# Patient Record
Sex: MALE | Race: BLACK OR AFRICAN AMERICAN | Employment: UNEMPLOYED | URBAN - METROPOLITAN AREA
[De-identification: names, ages, dates, MRNs, and addresses within clinical notes are randomized per-mention and may not be internally consistent; named-entity substitution may affect disease eponyms.]

---

## 2017-02-26 ENCOUNTER — APPOINTMENT (OUTPATIENT)
Dept: GENERAL RADIOLOGY | Age: 47
End: 2017-02-26
Attending: EMERGENCY MEDICINE
Payer: OTHER GOVERNMENT

## 2017-02-26 ENCOUNTER — HOSPITAL ENCOUNTER (EMERGENCY)
Age: 47
Discharge: HOME | End: 2017-02-26
Attending: EMERGENCY MEDICINE
Payer: OTHER GOVERNMENT

## 2017-02-26 VITALS
OXYGEN SATURATION: 99 % | DIASTOLIC BLOOD PRESSURE: 72 MMHG | BODY MASS INDEX: 28.17 KG/M2 | RESPIRATION RATE: 16 BRPM | WEIGHT: 165 LBS | TEMPERATURE: 97 F | SYSTOLIC BLOOD PRESSURE: 143 MMHG | HEART RATE: 88 BPM | HEIGHT: 64 IN

## 2017-02-26 DIAGNOSIS — R07.89 CHEST WALL PAIN: Primary | ICD-10-CM

## 2017-02-26 PROCEDURE — 99283 EMERGENCY DEPT VISIT LOW MDM: CPT | Performed by: EMERGENCY MEDICINE

## 2017-02-26 PROCEDURE — 71020 XR CHEST PA LAT: CPT

## 2017-02-26 PROCEDURE — 71100 X-RAY EXAM RIBS UNI 2 VIEWS: CPT

## 2017-02-26 RX ORDER — METHOCARBAMOL 750 MG/1
750 TABLET, FILM COATED ORAL 4 TIMES DAILY
Qty: 30 TAB | Refills: 0 | Status: SHIPPED | OUTPATIENT
Start: 2017-02-26 | End: 2017-03-06

## 2017-02-26 RX ORDER — TRAMADOL HYDROCHLORIDE 50 MG/1
50 TABLET ORAL
Qty: 20 TAB | Refills: 0 | Status: SHIPPED | OUTPATIENT
Start: 2017-02-26

## 2017-02-26 NOTE — ED PROVIDER NOTES
HPI Comments: Patient states that he was at a family member's house 2 days ago when he fell in the street and struck his right chest on the road. Since then he has had achy nonradiating pain worse with certain movements or deep breaths. He has not taken any medicine for his symptoms and he denies similar symptoms in the past.  He denies any shortness of breath or cough. Elements of this note were created with speech recognition software. As such, there may be errors of speech recognition present. Patient is a 42 y.o. male presenting with rib pain. The history is provided by the patient. Rib Pain   Pertinent negatives include no fever and no vomiting. History reviewed. No pertinent past medical history. Past Surgical History:   Procedure Laterality Date    HX OTHER SURGICAL      Hernia         History reviewed. No pertinent family history. Social History     Social History    Marital status: SINGLE     Spouse name: N/A    Number of children: N/A    Years of education: N/A     Occupational History    Not on file. Social History Main Topics    Smoking status: Current Every Day Smoker    Smokeless tobacco: Never Used      Comment: 3-4 black and milds per day    Alcohol use Yes      Comment: Socially    Drug use: No    Sexual activity: Not on file     Other Topics Concern    Not on file     Social History Narrative         ALLERGIES: Review of patient's allergies indicates no known allergies. Review of Systems   Constitutional: Negative for chills and fever. Gastrointestinal: Negative for nausea and vomiting. All other systems reviewed and are negative. Vitals:    02/26/17 0403   BP: 145/78   Resp: 14   Temp: 97 °F (36.1 °C)   SpO2: 98%   Weight: 74.8 kg (165 lb)   Height: 5' 4\" (1.626 m)            Physical Exam   Constitutional: He is oriented to person, place, and time. He appears well-developed and well-nourished. HENT:   Head: Normocephalic and atraumatic.    Eyes: Conjunctivae are normal. Pupils are equal, round, and reactive to light. Neck: Normal range of motion. Neck supple. Cardiovascular: Normal rate and regular rhythm. Pulmonary/Chest: No respiratory distress. He has no wheezes. He has no rales. He exhibits tenderness. Right anterior chest wall tenderness to palpation as indicated. Abdomen is soft, no tenderness to deep palpation of right upper quadrant   Musculoskeletal: He exhibits no edema or tenderness. Neurological: He is alert and oriented to person, place, and time. Skin: Skin is warm and dry. Psychiatric: He has a normal mood and affect. His behavior is normal.   Nursing note and vitals reviewed.        MDM  Number of Diagnoses or Management Options  Chest wall pain: new and does not require workup  Diagnosis management comments: Differential diagnoses: Chest wall contusion, rib fracture, pneumothorax  4:48 AM discussed x-ray findings with patient, no abnormality seen       Amount and/or Complexity of Data Reviewed  Tests in the radiology section of CPT®: ordered and reviewed  Independent visualization of images, tracings, or specimens: yes    Risk of Complications, Morbidity, and/or Mortality  Presenting problems: moderate  Diagnostic procedures: moderate  Management options: moderate    Patient Progress  Patient progress: improved    ED Course       Procedures

## 2017-02-26 NOTE — DISCHARGE INSTRUCTIONS
Musculoskeletal Chest Pain: Care Instructions  Your Care Instructions  Chest pain is not always a sign that something is wrong with your heart or that you have another serious problem. The doctor thinks your chest pain is caused by strained muscles or ligaments, inflamed chest cartilage, or another problem in your chest, rather than by your heart. You may need more tests to find the cause of your chest pain. Follow-up care is a key part of your treatment and safety. Be sure to make and go to all appointments, and call your doctor if you are having problems. Its also a good idea to know your test results and keep a list of the medicines you take. How can you care for yourself at home? · Take pain medicines exactly as directed. ¨ If the doctor gave you a prescription medicine for pain, take it as prescribed. ¨ If you are not taking a prescription pain medicine, ask your doctor if you can take an over-the-counter medicine. · Rest and protect the sore area. · Stop, change, or take a break from any activity that may be causing your pain or soreness. · Put ice or a cold pack on the sore area for 10 to 20 minutes at a time. Try to do this every 1 to 2 hours for the next 3 days (when you are awake) or until the swelling goes down. Put a thin cloth between the ice and your skin. · After 2 or 3 days, apply a heating pad set on low or a warm cloth to the area that hurts. Some doctors suggest that you go back and forth between hot and cold. · Do not wrap or tape your ribs for support. This may cause you to take smaller breaths, which could increase your risk of lung problems. · Mentholated creams such as Bengay or Icy Hot may soothe sore muscles. Follow the instructions on the package. · Follow your doctor's instructions for exercising. · Gentle stretching and massage may help you get better faster. Stretch slowly to the point just before pain begins, and hold the stretch for at least 15 to 30 seconds.  Do this 3 or 4 times a day. Stretch just after you have applied heat. · As your pain gets better, slowly return to your normal activities. Any increased pain may be a sign that you need to rest a while longer. When should you call for help? Call 911 anytime you think you may need emergency care. For example, call if:  · You have chest pain or pressure. This may occur with:  ¨ Sweating. ¨ Shortness of breath. ¨ Nausea or vomiting. ¨ Pain that spreads from the chest to the neck, jaw, or one or both shoulders or arms. ¨ Dizziness or lightheadedness. ¨ A fast or uneven pulse. After calling 911, chew 1 adult-strength aspirin. Wait for an ambulance. Do not try to drive yourself. · You have sudden chest pain and shortness of breath, or you cough up blood. Call your doctor now or seek immediate medical care if:  · You have any trouble breathing. · Your chest pain gets worse. · Your chest pain occurs consistently with exercise and is relieved by rest.  Watch closely for changes in your health, and be sure to contact your doctor if:  · Your chest pain does not get better after 1 week. Where can you learn more? Go to http://jolene-harmony.info/. Enter V293 in the search box to learn more about \"Musculoskeletal Chest Pain: Care Instructions. \"  Current as of: May 27, 2016  Content Version: 11.1  © 0289-4861 [employer]. Care instructions adapted under license by [employer] (which disclaims liability or warranty for this information). If you have questions about a medical condition or this instruction, always ask your healthcare professional. [place] [address] any warranty or liability for your use of this information.

## 2023-05-27 ENCOUNTER — HOSPITAL ENCOUNTER (EMERGENCY)
Age: 53
Discharge: HOME | End: 2023-05-27
Attending: EMERGENCY MEDICINE
Payer: COMMERCIAL

## 2023-05-27 ENCOUNTER — APPOINTMENT (OUTPATIENT)
Dept: GENERAL RADIOLOGY | Age: 53
End: 2023-05-27
Payer: COMMERCIAL

## 2023-05-27 ENCOUNTER — APPOINTMENT (OUTPATIENT)
Dept: CT IMAGING | Age: 53
End: 2023-05-27
Payer: COMMERCIAL

## 2023-05-27 VITALS
OXYGEN SATURATION: 96 % | TEMPERATURE: 98.5 F | DIASTOLIC BLOOD PRESSURE: 97 MMHG | WEIGHT: 155 LBS | BODY MASS INDEX: 24.33 KG/M2 | HEIGHT: 67 IN | SYSTOLIC BLOOD PRESSURE: 127 MMHG | HEART RATE: 60 BPM | RESPIRATION RATE: 19 BRPM

## 2023-05-27 DIAGNOSIS — R07.89 ATYPICAL CHEST PAIN: Primary | ICD-10-CM

## 2023-05-27 LAB
ALBUMIN SERPL-MCNC: 4.1 G/DL (ref 3.5–5)
ALBUMIN/GLOB SERPL: 1.3 (ref 0.4–1.6)
ALP SERPL-CCNC: 97 U/L (ref 45–117)
ALT SERPL-CCNC: 41 U/L (ref 13–61)
ANION GAP SERPL CALC-SCNC: 12 MMOL/L (ref 2–11)
AST SERPL-CCNC: 26 U/L (ref 15–37)
BILIRUB SERPL-MCNC: 0.2 MG/DL (ref 0.2–1.1)
BUN SERPL-MCNC: 15 MG/DL (ref 7–18)
CALCIUM SERPL-MCNC: 9.6 MG/DL (ref 8.3–10.4)
CHLORIDE SERPL-SCNC: 100 MMOL/L (ref 98–107)
CO2 SERPL-SCNC: 27 MMOL/L (ref 21–32)
CREAT SERPL-MCNC: 0.97 MG/DL (ref 0.8–1.5)
EKG ATRIAL RATE: 70 BPM
EKG DIAGNOSIS: NORMAL
EKG P AXIS: 52 DEGREES
EKG P-R INTERVAL: 170 MS
EKG Q-T INTERVAL: 386 MS
EKG QRS DURATION: 108 MS
EKG QTC CALCULATION (BAZETT): 414 MS
EKG R AXIS: 46 DEGREES
EKG T AXIS: 36 DEGREES
EKG VENTRICULAR RATE: 69 BPM
ERYTHROCYTE [DISTWIDTH] IN BLOOD BY AUTOMATED COUNT: 11.7 % (ref 11.9–14.6)
GLOBULIN SER CALC-MCNC: 3.2 G/DL (ref 2.8–4.5)
GLUCOSE SERPL-MCNC: 108 MG/DL (ref 65–100)
HCT VFR BLD AUTO: 40.5 % (ref 41.1–50.3)
HGB BLD-MCNC: 13.9 G/DL (ref 13.6–17.2)
MCH RBC QN AUTO: 32 PG (ref 26.1–32.9)
MCHC RBC AUTO-ENTMCNC: 34.3 G/DL (ref 31.4–35)
MCV RBC AUTO: 93.3 FL (ref 82–102)
NRBC # BLD: 0 K/UL (ref 0–0.2)
PLATELET # BLD AUTO: 409 K/UL (ref 150–450)
PMV BLD AUTO: 9.5 FL (ref 9.4–12.3)
POTASSIUM SERPL-SCNC: 3.5 MMOL/L (ref 3.5–5.1)
PROT SERPL-MCNC: 7.3 G/DL (ref 6.4–8.2)
RBC # BLD AUTO: 4.34 M/UL (ref 4.23–5.6)
SODIUM SERPL-SCNC: 139 MMOL/L (ref 133–143)
TROPONIN T SERPL HS-MCNC: <6 NG/L (ref 0–22)
WBC # BLD AUTO: 9.8 K/UL (ref 4.3–11.1)

## 2023-05-27 PROCEDURE — 2580000003 HC RX 258: Performed by: EMERGENCY MEDICINE

## 2023-05-27 PROCEDURE — 96374 THER/PROPH/DIAG INJ IV PUSH: CPT

## 2023-05-27 PROCEDURE — 6360000002 HC RX W HCPCS: Performed by: EMERGENCY MEDICINE

## 2023-05-27 PROCEDURE — 93005 ELECTROCARDIOGRAM TRACING: CPT | Performed by: EMERGENCY MEDICINE

## 2023-05-27 PROCEDURE — 84484 ASSAY OF TROPONIN QUANT: CPT

## 2023-05-27 PROCEDURE — 96375 TX/PRO/DX INJ NEW DRUG ADDON: CPT

## 2023-05-27 PROCEDURE — 71045 X-RAY EXAM CHEST 1 VIEW: CPT

## 2023-05-27 PROCEDURE — 85027 COMPLETE CBC AUTOMATED: CPT

## 2023-05-27 PROCEDURE — 71260 CT THORAX DX C+: CPT

## 2023-05-27 PROCEDURE — 80053 COMPREHEN METABOLIC PANEL: CPT

## 2023-05-27 PROCEDURE — 99285 EMERGENCY DEPT VISIT HI MDM: CPT

## 2023-05-27 PROCEDURE — 6360000004 HC RX CONTRAST MEDICATION: Performed by: EMERGENCY MEDICINE

## 2023-05-27 PROCEDURE — 93010 ELECTROCARDIOGRAM REPORT: CPT | Performed by: INTERNAL MEDICINE

## 2023-05-27 RX ORDER — MORPHINE SULFATE 4 MG/ML
4 INJECTION, SOLUTION INTRAMUSCULAR; INTRAVENOUS
Status: COMPLETED | OUTPATIENT
Start: 2023-05-27 | End: 2023-05-27

## 2023-05-27 RX ORDER — IBUPROFEN 800 MG/1
800 TABLET ORAL EVERY 8 HOURS PRN
Qty: 21 TABLET | Refills: 0 | Status: SHIPPED | OUTPATIENT
Start: 2023-05-27

## 2023-05-27 RX ORDER — TRAMADOL HYDROCHLORIDE 50 MG/1
50-100 TABLET ORAL EVERY 8 HOURS PRN
Qty: 12 TABLET | Refills: 0 | Status: SHIPPED | OUTPATIENT
Start: 2023-05-27 | End: 2023-05-30

## 2023-05-27 RX ORDER — SERTRALINE HYDROCHLORIDE 100 MG/1
100 TABLET, FILM COATED ORAL DAILY
COMMUNITY

## 2023-05-27 RX ORDER — SODIUM CHLORIDE 0.9 % (FLUSH) 0.9 %
10 SYRINGE (ML) INJECTION
Status: COMPLETED | OUTPATIENT
Start: 2023-05-27 | End: 2023-05-27

## 2023-05-27 RX ORDER — HYDROCHLOROTHIAZIDE 25 MG/1
25 TABLET ORAL DAILY
COMMUNITY

## 2023-05-27 RX ORDER — PANTOPRAZOLE SODIUM 40 MG/1
40 TABLET, DELAYED RELEASE ORAL DAILY
Qty: 30 TABLET | Refills: 2 | COMMUNITY
Start: 2023-03-08 | End: 2023-06-06

## 2023-05-27 RX ORDER — ONDANSETRON 2 MG/ML
4 INJECTION INTRAMUSCULAR; INTRAVENOUS
Status: COMPLETED | OUTPATIENT
Start: 2023-05-27 | End: 2023-05-27

## 2023-05-27 RX ORDER — OMEPRAZOLE 40 MG/1
40 CAPSULE, DELAYED RELEASE ORAL DAILY
COMMUNITY
Start: 2020-03-24

## 2023-05-27 RX ORDER — DEXAMETHASONE SODIUM PHOSPHATE 10 MG/ML
10 INJECTION INTRAMUSCULAR; INTRAVENOUS ONCE
Status: COMPLETED | OUTPATIENT
Start: 2023-05-27 | End: 2023-05-27

## 2023-05-27 RX ORDER — 0.9 % SODIUM CHLORIDE 0.9 %
100 INTRAVENOUS SOLUTION INTRAVENOUS
Status: COMPLETED | OUTPATIENT
Start: 2023-05-27 | End: 2023-05-27

## 2023-05-27 RX ORDER — KETOROLAC TROMETHAMINE 30 MG/ML
30 INJECTION, SOLUTION INTRAMUSCULAR; INTRAVENOUS
Status: COMPLETED | OUTPATIENT
Start: 2023-05-27 | End: 2023-05-27

## 2023-05-27 RX ADMIN — DEXAMETHASONE SODIUM PHOSPHATE 10 MG: 10 INJECTION INTRAMUSCULAR; INTRAVENOUS at 02:22

## 2023-05-27 RX ADMIN — SODIUM CHLORIDE, PRESERVATIVE FREE 10 ML: 5 INJECTION INTRAVENOUS at 01:23

## 2023-05-27 RX ADMIN — SODIUM CHLORIDE 100 ML: 9 INJECTION, SOLUTION INTRAVENOUS at 01:23

## 2023-05-27 RX ADMIN — ONDANSETRON 4 MG: 2 INJECTION INTRAMUSCULAR; INTRAVENOUS at 01:00

## 2023-05-27 RX ADMIN — MORPHINE SULFATE 4 MG: 4 INJECTION INTRAVENOUS at 01:01

## 2023-05-27 RX ADMIN — IOPAMIDOL 100 ML: 755 INJECTION, SOLUTION INTRAVENOUS at 01:23

## 2023-05-27 RX ADMIN — KETOROLAC TROMETHAMINE 30 MG: 30 INJECTION, SOLUTION INTRAMUSCULAR; INTRAVENOUS at 00:29

## 2023-05-27 ASSESSMENT — ENCOUNTER SYMPTOMS
EYE REDNESS: 0
DIARRHEA: 0
ABDOMINAL PAIN: 0
COLOR CHANGE: 0
EYE DISCHARGE: 0
VOMITING: 0
NAUSEA: 0
FACIAL SWELLING: 0
SHORTNESS OF BREATH: 0
RHINORRHEA: 0
COUGH: 1
BACK PAIN: 0

## 2023-05-27 ASSESSMENT — LIFESTYLE VARIABLES
HOW MANY STANDARD DRINKS CONTAINING ALCOHOL DO YOU HAVE ON A TYPICAL DAY: 3 OR 4
HOW OFTEN DO YOU HAVE A DRINK CONTAINING ALCOHOL: MONTHLY OR LESS

## 2023-05-27 ASSESSMENT — PAIN - FUNCTIONAL ASSESSMENT: PAIN_FUNCTIONAL_ASSESSMENT: 0-10

## 2023-05-27 ASSESSMENT — PAIN DESCRIPTION - LOCATION: LOCATION: CHEST

## 2023-05-27 ASSESSMENT — PAIN SCALES - GENERAL: PAINLEVEL_OUTOF10: 10

## 2023-05-27 ASSESSMENT — PAIN DESCRIPTION - ORIENTATION: ORIENTATION: LEFT

## 2023-05-27 NOTE — ED NOTES
I have reviewed discharge instructions with the patient. The patient verbalized understanding. Patient left ED via Discharge Method: ambulatory to Home with friend    Opportunity for questions and clarification provided. Patient given 2 scripts. To continue your aftercare when you leave the hospital, you may receive an automated call from our care team to check in on how you are doing. This is a free service and part of our promise to provide the best care and service to meet your aftercare needs.  If you have questions, or wish to unsubscribe from this service please call [phone]. Thank you for Choosing our [facility].         Yasmeen Sewell RN  05/27/23 9394

## 2023-05-27 NOTE — ED TRIAGE NOTES
Pt arrived via EMS from work after CP started while at work tonight while walking around. Pt verbalizes increase in pain with deep breathing and some SOB. Pt received 324mg of ASA and 1 sub nitro with no relief. Pt in NAD.

## 2023-05-27 NOTE — ED PROVIDER NOTES
Emergency Department Provider Note       PCP: Nimco Gross MD   Age: 50 y.o. Sex: male     DISPOSITION Decision To Discharge 05/27/2023 02:08:39 AM       ICD-10-CM    1. Atypical chest pain  R07.89 traMADol (ULTRAM) 50 MG tablet          Medical Decision Making     Complexity of Problems Addressed:  1 or more acute illnesses that pose a threat to life or bodily function. Data Reviewed and Analyzed:  Category 1:   I independently ordered and reviewed each unique test.     Category 2:   I independently ordered and interpreted the ED EKG in the absence of a Cardiologist.    Rate: 69  EKG Interpretation: EKG Interpretation: sinus rhythm, no evidence of arrhythmia and non-specific EKG  ST Segments: Normal ST segments - NO STEMI  I interpreted the X-rays no acute infiltrate. I interpreted the CT Scan no pulmonary embolism. Category 3: Discussion of management or test interpretation. Atypical chest pain partially pleuritic partially musculoskeletal  Constant for 5 hours, troponin negative EKG normal no PE on CT scan  We will treat for musculoskeletal etiology    Risk of Complications and/or Morbidity of Patient Management:  Prescription drug management performed. Parental controlled substances given in the ED. Patient was discharged risks and benefits of hospitalization were considered. Shared medical decision making was utilized in creating the patients health plan today. Is this patient to be included in the SEP-1 core measure due to severe sepsis or septic shock? No Exclusion criteria - the patient is NOT to be included for SEP-1 Core Measure due to:  Infection is not suspected      History      Zoey Tong is a 50 y.o. male who presents to the Emergency Department with chief complaint of    Chief Complaint   Patient presents with    Chest Pain      Chief complaint : Chest pain    HISTORY OF PRESENT ILLNESS :  Location : Left anterior    Quality : Dull    Quantity : Constant    Timing : 7

## 2024-08-04 ENCOUNTER — HOSPITAL ENCOUNTER (EMERGENCY)
Age: 54
Discharge: HOME | End: 2024-08-04
Attending: EMERGENCY MEDICINE
Payer: COMMERCIAL

## 2024-08-04 VITALS
BODY MASS INDEX: 24.33 KG/M2 | HEART RATE: 99 BPM | OXYGEN SATURATION: 96 % | RESPIRATION RATE: 15 BRPM | SYSTOLIC BLOOD PRESSURE: 145 MMHG | TEMPERATURE: 98.4 F | WEIGHT: 155 LBS | DIASTOLIC BLOOD PRESSURE: 99 MMHG | HEIGHT: 67 IN

## 2024-08-04 DIAGNOSIS — U07.1 COVID-19: Primary | ICD-10-CM

## 2024-08-04 LAB
FLUAV RNA SPEC QL NAA+PROBE: NOT DETECTED
FLUBV RNA SPEC QL NAA+PROBE: NOT DETECTED
SARS-COV-2 RDRP RESP QL NAA+PROBE: DETECTED
SOURCE: ABNORMAL

## 2024-08-04 PROCEDURE — 87502 INFLUENZA DNA AMP PROBE: CPT

## 2024-08-04 PROCEDURE — 87635 SARS-COV-2 COVID-19 AMP PRB: CPT

## 2024-08-04 PROCEDURE — 99283 EMERGENCY DEPT VISIT LOW MDM: CPT

## 2024-08-04 ASSESSMENT — LIFESTYLE VARIABLES
HOW OFTEN DO YOU HAVE A DRINK CONTAINING ALCOHOL: 2-4 TIMES A MONTH
HOW MANY STANDARD DRINKS CONTAINING ALCOHOL DO YOU HAVE ON A TYPICAL DAY: 3 OR 4

## 2024-08-04 ASSESSMENT — PAIN - FUNCTIONAL ASSESSMENT: PAIN_FUNCTIONAL_ASSESSMENT: 0-10

## 2024-08-04 ASSESSMENT — PAIN SCALES - GENERAL: PAINLEVEL_OUTOF10: 8

## 2024-08-04 NOTE — ED TRIAGE NOTES
Patient ambulatory to room c/o cough, congestion, RUQ/LUQ pain that \"feels like acid reflux\" which the patient states he takes medicine for. Symptoms started yesterday morning.

## 2024-08-04 NOTE — DISCHARGE INSTRUCTIONS
Follow-up with your doctor as needed.  Wear a mask in public and be very careful around elderly friends and family.  Return to the emergency department if your symptoms worsen.

## 2024-08-04 NOTE — ED PROVIDER NOTES
Emergency Department Provider Note       PCP: No, Pcp   Age: 53 y.o.   Sex: male     DISPOSITION Decision To Discharge 08/04/2024 04:25:45 AM       ICD-10-CM    1. COVID-19  U07.1           Medical Decision Making     53-year-old male with a history of hypertension and GERD finished a cruise yesterday and has had sinus congestion and a cough since then.  He has no hypoxia or fever, vital signs are normal.  His COVID swab is positive, discussed this with him.  He is conversant without any dyspnea.  He has a primary doctor he can follow-up with.  I discussion with him about need to wear a mask and avoid elderly and/or susceptible family and friends.     1 acute, uncomplicated illness or injury.    I independently ordered and reviewed each unique test.                     History     Patient is a 53-year-old male with a history of hypertension and GERD who presents with sinus congestion and a cough.  He states he arrived for from a cruise yesterday when his symptoms started.  He denies any fever.  He also has been having symptoms of reflux, he has a history of GERD.  He denies any aggravating or alleviating factors        ROS     Review of Systems   Constitutional:  Negative for chills and fever.   HENT:  Positive for congestion.    Respiratory:  Positive for cough.    Gastrointestinal:  Negative for diarrhea and vomiting.   All other systems reviewed and are negative.       Physical Exam     Vitals signs and nursing note reviewed:  Vitals:    08/04/24 0330   BP: (!) 145/99   Pulse: 99   Resp: 15   Temp: 98.4 °F (36.9 °C)   TempSrc: Oral   SpO2: 96%   Weight: 70.3 kg (155 lb)   Height: 1.702 m (5' 7\")      Physical Exam  Vitals and nursing note reviewed.   Constitutional:       General: He is not in acute distress.     Appearance: Normal appearance.   HENT:      Head: Normocephalic and atraumatic.   Eyes:      General:         Right eye: No discharge.         Left eye: No discharge.      Conjunctiva/sclera:

## 2025-01-27 ENCOUNTER — HOSPITAL ENCOUNTER (OUTPATIENT)
Dept: CT IMAGING | Age: 55
Discharge: HOME | End: 2025-01-29
Attending: INTERNAL MEDICINE
Payer: OTHER GOVERNMENT

## 2025-01-27 DIAGNOSIS — R22.1 NECK MASS: ICD-10-CM

## 2025-01-27 LAB — CREAT BLD-MCNC: 0.8 MG/DL (ref 0.8–1.5)

## 2025-01-27 PROCEDURE — 70491 CT SOFT TISSUE NECK W/DYE: CPT

## 2025-01-27 PROCEDURE — 82565 ASSAY OF CREATININE: CPT

## 2025-01-27 PROCEDURE — 6360000004 HC RX CONTRAST MEDICATION: Performed by: INTERNAL MEDICINE

## 2025-01-27 RX ORDER — IOPAMIDOL 755 MG/ML
100 INJECTION, SOLUTION INTRAVASCULAR
Status: COMPLETED | OUTPATIENT
Start: 2025-01-27 | End: 2025-01-27

## 2025-01-27 RX ADMIN — IOPAMIDOL 100 ML: 755 INJECTION, SOLUTION INTRAVENOUS at 13:18

## 2025-04-15 ENCOUNTER — OFFICE VISIT (OUTPATIENT)
Dept: ENT CLINIC | Age: 55
End: 2025-04-15
Payer: OTHER GOVERNMENT

## 2025-04-15 VITALS
WEIGHT: 157.5 LBS | BODY MASS INDEX: 24.72 KG/M2 | DIASTOLIC BLOOD PRESSURE: 92 MMHG | HEIGHT: 67 IN | SYSTOLIC BLOOD PRESSURE: 138 MMHG

## 2025-04-15 DIAGNOSIS — E01.0 THYROMEGALY: Primary | ICD-10-CM

## 2025-04-15 PROCEDURE — 99204 OFFICE O/P NEW MOD 45 MIN: CPT | Performed by: OTOLARYNGOLOGY

## 2025-04-15 RX ORDER — AMPICILLIN TRIHYDRATE 500 MG
1000 CAPSULE ORAL DAILY
COMMUNITY

## 2025-04-15 RX ORDER — TRAZODONE HYDROCHLORIDE 50 MG/1
TABLET ORAL
COMMUNITY

## 2025-04-15 RX ORDER — CYCLOBENZAPRINE HCL 10 MG
10 TABLET ORAL 3 TIMES DAILY
COMMUNITY

## 2025-04-15 ASSESSMENT — ENCOUNTER SYMPTOMS
SHORTNESS OF BREATH: 0
SORE THROAT: 0
ABDOMINAL PAIN: 0

## 2025-04-15 NOTE — PROGRESS NOTES
Chief Complaint   Patient presents with    New Patient     Abnormal Imaging of the Thyroid   Ct neck done 1/27/25       HPI:  Shiraz Collazo is a 54 y.o. male seen today in initial consultation for an enlarged thyroid.  He presented about 1 month ago with left-sided neck pain and was worked up with a CT scan which revealed thyromegaly.  He has no previous thyroid pathology, and there is no family history of thyroid cancer.  His mother takes thyroid medication for suspected hypothyroidism.  He has continued to complain of intermittent left-sided neck pain which will present every few days and last for a few hours at a time.  He has not yet had an ultrasound or FNA.  He does have underlying reflux and takes Prilosec daily.  His swallowing has remained stable, and he denies any dysphagia to solid foods or pills, and there is no dyspnea when lying supine.    Past Medical History, Past Surgical History, Family history, Social History, and Medications were all reviewed with the patient today and updated as necessary.     No Known Allergies  There is no problem list on file for this patient.    Current Outpatient Medications   Medication Sig    vitamin D (D 1000) 25 MCG (1000 UT) CAPS Take 1 capsule by mouth daily    cyclobenzaprine (FLEXERIL) 10 MG tablet Take 1 tablet by mouth 3 times daily    traZODone (DESYREL) 50 MG tablet Take by mouth    omeprazole (PRILOSEC) 40 MG delayed release capsule Take 1 capsule by mouth daily    sertraline (ZOLOFT) 100 MG tablet Take 1 tablet by mouth daily    hydroCHLOROthiazide (HYDRODIURIL) 25 MG tablet Take 1 tablet by mouth daily    ibuprofen (IBU) 800 MG tablet Take 1 tablet by mouth every 8 hours as needed for Pain    pantoprazole (PROTONIX) 40 MG tablet Take 1 tablet by mouth daily     No current facility-administered medications for this visit.     Past Medical History:   Diagnosis Date    GERD (gastroesophageal reflux disease)     Hypertension      Social History     Tobacco Use